# Patient Record
Sex: FEMALE | Race: WHITE | NOT HISPANIC OR LATINO | Employment: FULL TIME | URBAN - NONMETROPOLITAN AREA
[De-identification: names, ages, dates, MRNs, and addresses within clinical notes are randomized per-mention and may not be internally consistent; named-entity substitution may affect disease eponyms.]

---

## 2018-02-16 ENCOUNTER — HISTORICAL (OUTPATIENT)
Dept: ADMINISTRATIVE | Facility: HOSPITAL | Age: 43
End: 2018-02-16

## 2018-03-07 ENCOUNTER — HISTORICAL (OUTPATIENT)
Dept: ADMINISTRATIVE | Facility: HOSPITAL | Age: 43
End: 2018-03-07

## 2018-05-01 ENCOUNTER — HISTORICAL (OUTPATIENT)
Dept: ADMINISTRATIVE | Facility: HOSPITAL | Age: 43
End: 2018-05-01

## 2019-06-10 ENCOUNTER — HISTORICAL (OUTPATIENT)
Dept: ADMINISTRATIVE | Facility: HOSPITAL | Age: 44
End: 2019-06-10

## 2021-06-08 LAB
ALBUMIN SERPL-MCNC: 4.2 G/DL (ref 3.4–5)
ALBUMIN/GLOB SERPL: 1.6 {RATIO}
ALP SERPL-CCNC: 90 U/L (ref 50–144)
ALT SERPL-CCNC: 23 U/L (ref 1–45)
ANION GAP SERPL CALC-SCNC: 6 MMOL/L (ref 7–16)
AST SERPL-CCNC: 34 U/L (ref 14–36)
BILIRUB SERPL-MCNC: 0.35 MG/DL (ref 0.1–1)
BUN SERPL-MCNC: 15 MG/DL (ref 7–20)
CALCIUM SERPL-MCNC: 9.9 MG/DL (ref 8.4–10.2)
CHLORIDE SERPL-SCNC: 103 MMOL/L (ref 94–110)
CHOLEST SERPL-MCNC: 213 MG/DL (ref 0–200)
CO2 SERPL-SCNC: 30 MMOL/L (ref 21–32)
CREAT SERPL-MCNC: 0.7 MG/DL (ref 0.52–1.04)
CREAT/UREA NIT SERPL: 21.4 (ref 12–20)
GLOBULIN SER-MCNC: 2.7 G/DL (ref 2–3.9)
GLUCOSE SERPL-MCNC: 107 MGM./DL (ref 70–115)
HDLC SERPL-MCNC: 64 MG/DL (ref 40–60)
LDLC SERPL CALC-MCNC: 112.1 MG/DL (ref 30–100)
POTASSIUM SERPL-SCNC: 4 MMOL/L (ref 3.5–5.1)
PROT SERPL-MCNC: 6.9 G/DL (ref 6.3–8.2)
SODIUM SERPL-SCNC: 139 MMOL/L (ref 135–145)
TRIGL SERPL-MCNC: 153 MG/DL (ref 30–200)

## 2021-07-12 LAB — TSH SERPL-ACNC: 21.8 UIU/ML (ref 0.36–3.74)

## 2021-10-15 LAB
ALBUMIN SERPL-MCNC: 4.1 G/DL (ref 3.4–5)
ALBUMIN/GLOB SERPL: 1.6 {RATIO}
ALP SERPL-CCNC: 83 U/L (ref 50–144)
ALT SERPL-CCNC: 22 U/L (ref 1–45)
ANION GAP SERPL CALC-SCNC: 8 MMOL/L (ref 7–16)
AST SERPL-CCNC: 32 U/L (ref 14–36)
BILIRUB SERPL-MCNC: 0.29 MG/DL (ref 0.1–1)
BUN SERPL-MCNC: 15 MG/DL (ref 7–20)
CALCIUM SERPL-MCNC: 9.7 MG/DL (ref 8.4–10.2)
CHLORIDE SERPL-SCNC: 104 MMOL/L (ref 94–110)
CO2 SERPL-SCNC: 30 MMOL/L (ref 21–32)
CREAT SERPL-MCNC: 0.69 MG/DL (ref 0.52–1.04)
CREAT/UREA NIT SERPL: 21.7 (ref 12–20)
GLOBULIN SER-MCNC: 2.6 G/DL (ref 2–3.9)
GLUCOSE SERPL-MCNC: 93 MGM./DL (ref 70–115)
POTASSIUM SERPL-SCNC: 4 MMOL/L (ref 3.5–5.1)
PROT SERPL-MCNC: 6.7 G/DL (ref 6.3–8.2)
SODIUM SERPL-SCNC: 142 MMOL/L (ref 135–145)
TSH SERPL-ACNC: <0.07 UIU/ML (ref 0.36–3.74)

## 2021-12-14 LAB
ALBUMIN SERPL-MCNC: 4.7 G/DL (ref 3.4–5)
ALBUMIN/GLOB SERPL: 1.7 {RATIO}
ALP SERPL-CCNC: 87 U/L (ref 50–144)
ALT SERPL-CCNC: 18 U/L (ref 1–45)
ANION GAP SERPL CALC-SCNC: 6 MMOL/L (ref 2–13)
AST SERPL-CCNC: 29 U/L (ref 14–36)
BILIRUB SERPL-MCNC: 0.33 MG/DL (ref 0–1)
BUN SERPL-MCNC: 16 MG/DL (ref 7–20)
CALCIUM SERPL-MCNC: 9.7 MG/DL (ref 8.4–10.2)
CHLORIDE SERPL-SCNC: 105 MMOL/L (ref 94–110)
CO2 SERPL-SCNC: 29 MMOL/L (ref 21–32)
CREAT SERPL-MCNC: 0.8 MG/DL (ref 0.52–1.04)
CREAT/UREA NIT SERPL: 20 (ref 12–20)
GLOBULIN SER-MCNC: 2.7 G/DL (ref 2–3.9)
GLUCOSE SERPL-MCNC: 110 MGM./DL (ref 70–115)
POTASSIUM SERPL-SCNC: 3.6 MMOL/L (ref 3.5–5.1)
PROT SERPL-MCNC: 7.4 G/DL (ref 6.3–8.2)
SODIUM SERPL-SCNC: 140 MMOL/L (ref 135–145)
T4 FREE SERPL-MCNC: 0.45 NG/DL (ref 0.78–2.19)
TSH SERPL-ACNC: 47.7 UIU/ML (ref 0.36–3.74)

## 2022-02-04 LAB — TSH SERPL-ACNC: 0.21 M[IU]/L (ref 0.36–3.74)

## 2022-02-15 LAB
AGE: 48
ALBUMIN SERPL-MCNC: 4 G/DL (ref 3.4–5)
ALBUMIN/GLOB SERPL: 1.5 {RATIO}
ALP SERPL-CCNC: 84 U/L (ref 50–144)
ALT SERPL-CCNC: 14 U/L (ref 1–45)
ANION GAP SERPL CALC-SCNC: 6 MMOL/L (ref 2–13)
AST SERPL-CCNC: 24 U/L (ref 14–36)
BILIRUB SERPL-MCNC: 0.35 MG/DL (ref 0–1)
BUN SERPL-MCNC: 19 MG/DL (ref 7–20)
CALCIUM SERPL-MCNC: 9.2 MG/DL (ref 8.4–10.2)
CHLORIDE SERPL-SCNC: 106 MMOL/L (ref 98–110)
CO2 SERPL-SCNC: 29 MMOL/L (ref 21–32)
CREAT SERPL-MCNC: 0.66 MG/DL (ref 0.52–1.04)
CREAT/UREA NIT SERPL: 28.8 (ref 12–20)
GLOBULIN SER-MCNC: 2.6 G/DL (ref 2–3.9)
GLUCOSE SERPL-MCNC: 114 MG/DL (ref 70–115)
POTASSIUM SERPL-SCNC: 4.1 MMOL/L (ref 3.5–5.1)
PROT SERPL-MCNC: 6.6 G/DL (ref 6.3–8.2)
SODIUM SERPL-SCNC: 141 MMOL/L (ref 135–145)
TSH SERPL-ACNC: <0.07 M[IU]/L (ref 0.36–3.74)

## 2022-04-10 ENCOUNTER — HISTORICAL (OUTPATIENT)
Dept: ADMINISTRATIVE | Facility: HOSPITAL | Age: 47
End: 2022-04-10

## 2022-04-26 VITALS
HEIGHT: 55 IN | WEIGHT: 173.06 LBS | SYSTOLIC BLOOD PRESSURE: 118 MMHG | BODY MASS INDEX: 40.05 KG/M2 | OXYGEN SATURATION: 99 % | DIASTOLIC BLOOD PRESSURE: 88 MMHG

## 2022-05-04 ENCOUNTER — HISTORICAL (OUTPATIENT)
Dept: ADMINISTRATIVE | Facility: HOSPITAL | Age: 47
End: 2022-05-04

## 2023-08-02 NOTE — PROGRESS NOTES
Chief Complaint: Annual exam    Chief Complaint   Patient presents with    Well Woman       HPI:   Amparo Dhaliwal is a 48 y.o. year old  Presents today to reestablish Gyn care. Denies vaginal bleeding.       GYN History:  MENOPAUSAL:  Age at menarche: 10  Age at menopause: 38  Hysterectomy:  No  Last pap: 2018 WNL, +HPV, Negative 16/18/45/GC/CZ/TV  H/o Abnormal Pap: Yes   Colposcopy: Yes  Postcoital Bleeding: No  Sexually Active: Yes   Dyspareunia: No  H/o STI: HPV  HRT: None  MM18  H/o abnl MMG: no   Colonoscopy: 2019 WNL, repeat in 10 yrs         Past Medical History:   Diagnosis Date    Abnormal Pap smear of cervix     Depression     Hypothyroidism, unspecified     Postmenopausal bleeding      Past Surgical History:   Procedure Laterality Date    APPENDECTOMY      cervix frozen d/t abnormal pap      DILATION AND CURETTAGE OF UTERUS  2018    Dr. Ye Forrest; PMB, thickened endometrial stripe    DILATION AND CURETTAGE OF UTERUS USING SUCTION      [facility]    TUBAL LIGATION  09/10/1999       Current Outpatient Medications:     naproxen (NAPROSYN) 500 MG tablet, Take 500 mg by mouth 2 (two) times daily., Disp: , Rfl:     tiZANidine (ZANAFLEX) 4 MG tablet, Take 4 mg by mouth., Disp: , Rfl:   Review of patient's allergies indicates:  No Known Allergies  OB History    Para Term  AB Living   5 4 3 1 1 4   SAB IAB Ectopic Multiple Live Births   1       4      # Outcome Date GA Lbr Andrea/2nd Weight Sex Delivery Anes PTL Lv   5 Term 09/10/99 40w0d  3.175 kg (7 lb) F CS-LTranv Spinal  WILLIAM   4  98 34w0d  3.175 kg (7 lb) M CS-LTranv Spinal  WILLIAM      Complications: Pre-eclampsia, severe, Low amniotic fluid   3 Term 95 40w0d  3.175 kg (7 lb) F CS-LTranv Spinal  WILLIAM   2 Term 93 40w0d  2.722 kg (6 lb) M Vag-Spont   WILLIAM   1 SAB  6w0d       FD     Social History     Tobacco Use    Smoking status: Every Day     Current packs/day: 0.00     Types: Cigarettes, Vaping  "with nicotine    Smokeless tobacco: Never   Substance Use Topics    Alcohol use: Never    Drug use: Yes     Frequency: 7.0 times per week     Types: Marijuana     Comment: "once daily, I have the medical card"     Family History   Problem Relation Age of Onset    Colon cancer Father     Thyroid cancer Father     Ovarian cancer Mother     Breast cancer Mother        Review of Systems:   Review of Systems   Constitutional:  Negative for appetite change, chills, fatigue, fever and unexpected weight change.   Eyes:  Negative for visual disturbance.   Respiratory:  Negative for cough, shortness of breath and wheezing.    Cardiovascular:  Negative for chest pain, palpitations and leg swelling.   Gastrointestinal:  Negative for abdominal pain, bloating, blood in stool, constipation, diarrhea, nausea, vomiting, reflux and fecal incontinence.   Endocrine: Negative for hair loss and hot flashes.   Genitourinary:  Negative for bladder incontinence, decreased libido, dysmenorrhea, dyspareunia, dysuria, flank pain, frequency, genital sores, hematuria, hot flashes, menorrhagia, menstrual problem, pelvic pain, urgency, vaginal bleeding, vaginal discharge, vaginal pain, urinary incontinence, postcoital bleeding, postmenopausal bleeding, vaginal dryness and vaginal odor.   Integumentary:  Negative for rash, acne, hair changes, breast mass, nipple discharge, breast skin changes and breast tenderness.   Neurological:  Negative for headaches.   Psychiatric/Behavioral:  Negative for depression.    Breast: Negative for asymmetry, breast self exam, lump, mass, mastodynia, nipple discharge, skin changes and tenderness       Physical Exam:  /70 (BP Location: Left arm, Patient Position: Sitting)   Ht 4' 11" (1.499 m)   Wt 74.3 kg (163 lb 12.8 oz)   BMI 33.08 kg/m²       Physical Exam:   Constitutional: She is oriented to person, place, and time. She appears well-developed and well-nourished.    HENT:   Head: Normocephalic.    "   Cardiovascular:       Exam reveals no edema.        Pulmonary/Chest: Effort normal. She exhibits no mass, no tenderness, no bony tenderness, no deformity and no retraction. Right breast exhibits no inverted nipple, no mass, no nipple discharge, no skin change, no tenderness, no bleeding, no swelling, no mastectomy, no augmentation and no lumpectomy. Left breast exhibits no inverted nipple, no mass, no nipple discharge, no skin change, no tenderness, no bleeding, no swelling, no mastectomy, no augmentation and no lumpectomy. Breasts are symmetrical.        Abdominal: Soft. She exhibits no distension and no mass. There is no abdominal tenderness. There is no rebound and no guarding. No hernia. Hernia confirmed negative in the right inguinal area.     Genitourinary:    Inguinal canal, vagina, uterus, right adnexa, left adnexa and rectum normal.   Rectum:      No anal fissure or external hemorrhoid.   The external female genitalia was normal.   No external genitalia lesions identified,Genitalia hair distrobution normal .   Labial bartholins normal.There is no rash, tenderness, lesion or injury on the right labia. There is no rash, tenderness, lesion or injury on the left labia. Cervix is normal. No no masses or organomegaly. Right adnexum displays no mass, no tenderness and no fullness. Left adnexum displays no mass, no tenderness and no fullness. Vagina exhibits no lesion. No erythema,  no vaginal discharge, tenderness, bleeding, rectocele, cystocele or unspecified prolapse of vaginal walls in the vagina.    No foreign body in the vagina.      No signs of injury in the vagina.   Vagina was moist.Cervix exhibits no motion tenderness, no lesion, no discharge, no friability, no lesion, no tenderness and no polyp. Uterus consistancy normal. and Uerus contour normal  Uterus is not deviated, not enlarged, not fixed, not tender, not hosting fibroids and no uterine prolapse. Normal urethral meatus.Urethral Meatus exhibits:  urethral lesion and prolapsedUrethra findings: no urethral mass and no tendernessBladder findings: no bladder tenderness          Musculoskeletal: Normal range of motion.      Lymphadenopathy: No inguinal adenopathy noted on the right or left side.    Neurological: She is alert and oriented to person, place, and time.    Skin: Skin is warm and dry.    Psychiatric: She has a normal mood and affect. Her behavior is normal. Judgment and thought content normal.        Assessment:   Annual Well Women Exam  1. Encounter for well woman exam with abnormal findings  - Liquid-Based Pap Smear, Screening Screening    2. Class 1 obesity with body mass index (BMI) of 33.0 to 33.9 in adult, unspecified obesity type, unspecified whether serious comorbidity present    3. Encounter for screening mammogram for breast cancer  - Mammo Digital Screening Bilat w/ [facility]ust[facility]; Future    4. Screening for malignant neoplasm of the cervix  - Liquid-Based Pap Smear, Screening Screening        Plan:  Pap Done  MMG  Breast Self-awareness  Recommend annual mammogram  Recommend exercise at least 3 times weekly  Healthy, balanced diet  Keep yearly follow up with PCP  RTC annual/prn

## 2023-08-04 ENCOUNTER — OFFICE VISIT (OUTPATIENT)
Dept: OBSTETRICS AND GYNECOLOGY | Facility: CLINIC | Age: 48
End: 2023-08-04
Payer: MEDICAID

## 2023-08-04 VITALS
BODY MASS INDEX: 33.02 KG/M2 | DIASTOLIC BLOOD PRESSURE: 70 MMHG | SYSTOLIC BLOOD PRESSURE: 116 MMHG | WEIGHT: 163.81 LBS | HEIGHT: 59 IN

## 2023-08-04 DIAGNOSIS — Z12.4 SCREENING FOR MALIGNANT NEOPLASM OF THE CERVIX: ICD-10-CM

## 2023-08-04 DIAGNOSIS — E66.9 CLASS 1 OBESITY WITH BODY MASS INDEX (BMI) OF 33.0 TO 33.9 IN ADULT, UNSPECIFIED OBESITY TYPE, UNSPECIFIED WHETHER SERIOUS COMORBIDITY PRESENT: ICD-10-CM

## 2023-08-04 DIAGNOSIS — Z12.31 ENCOUNTER FOR SCREENING MAMMOGRAM FOR BREAST CANCER: ICD-10-CM

## 2023-08-04 DIAGNOSIS — Z01.411 ENCOUNTER FOR WELL WOMAN EXAM WITH ABNORMAL FINDINGS: Primary | ICD-10-CM

## 2023-08-04 PROCEDURE — 3074F SYST BP LT 130 MM HG: CPT | Mod: CPTII,,, | Performed by: OBSTETRICS & GYNECOLOGY

## 2023-08-04 PROCEDURE — 88174 CYTOPATH C/V AUTO IN FLUID: CPT | Performed by: OBSTETRICS & GYNECOLOGY

## 2023-08-04 PROCEDURE — 3078F DIAST BP <80 MM HG: CPT | Mod: CPTII,,, | Performed by: OBSTETRICS & GYNECOLOGY

## 2023-08-04 PROCEDURE — 3008F BODY MASS INDEX DOCD: CPT | Mod: CPTII,,, | Performed by: OBSTETRICS & GYNECOLOGY

## 2023-08-04 PROCEDURE — 3078F PR MOST RECENT DIASTOLIC BLOOD PRESSURE < 80 MM HG: ICD-10-PCS | Mod: CPTII,,, | Performed by: OBSTETRICS & GYNECOLOGY

## 2023-08-04 PROCEDURE — 99386 PR PREVENTIVE VISIT,NEW,40-64: ICD-10-PCS | Mod: ,,, | Performed by: OBSTETRICS & GYNECOLOGY

## 2023-08-04 PROCEDURE — 3074F PR MOST RECENT SYSTOLIC BLOOD PRESSURE < 130 MM HG: ICD-10-PCS | Mod: CPTII,,, | Performed by: OBSTETRICS & GYNECOLOGY

## 2023-08-04 PROCEDURE — 3008F PR BODY MASS INDEX (BMI) DOCUMENTED: ICD-10-PCS | Mod: CPTII,,, | Performed by: OBSTETRICS & GYNECOLOGY

## 2023-08-04 PROCEDURE — 99386 PREV VISIT NEW AGE 40-64: CPT | Mod: ,,, | Performed by: OBSTETRICS & GYNECOLOGY

## 2023-08-04 PROCEDURE — 1159F PR MEDICATION LIST DOCUMENTED IN MEDICAL RECORD: ICD-10-PCS | Mod: CPTII,,, | Performed by: OBSTETRICS & GYNECOLOGY

## 2023-08-04 PROCEDURE — 1159F MED LIST DOCD IN RCRD: CPT | Mod: CPTII,,, | Performed by: OBSTETRICS & GYNECOLOGY

## 2023-08-04 PROCEDURE — 87624 HPV HI-RISK TYP POOLED RSLT: CPT

## 2023-08-04 RX ORDER — TIZANIDINE 4 MG/1
4 TABLET ORAL
COMMUNITY
Start: 2023-07-27

## 2023-08-04 RX ORDER — NAPROXEN 500 MG/1
500 TABLET ORAL 2 TIMES DAILY
COMMUNITY
Start: 2023-07-31

## 2023-08-09 LAB — PSYCHE PATHOLOGY RESULT: NORMAL

## 2023-08-11 ENCOUNTER — HOSPITAL ENCOUNTER (OUTPATIENT)
Dept: RADIOLOGY | Facility: HOSPITAL | Age: 48
Discharge: HOME | End: 2023-08-11
Attending: OBSTETRICS & GYNECOLOGY
Payer: MEDICAID

## 2023-08-11 VITALS — WEIGHT: 163 LBS | HEIGHT: 59 IN | BODY MASS INDEX: 32.86 KG/M2

## 2023-08-11 DIAGNOSIS — Z12.31 ENCOUNTER FOR SCREENING MAMMOGRAM FOR BREAST CANCER: ICD-10-CM

## 2023-08-11 PROCEDURE — 77067 SCR MAMMO BI INCL CAD: CPT | Mod: TC

## 2023-08-15 ENCOUNTER — PATIENT MESSAGE (OUTPATIENT)
Dept: OBSTETRICS AND GYNECOLOGY | Facility: CLINIC | Age: 48
End: 2023-08-15
Payer: MEDICAID

## 2023-08-15 ENCOUNTER — TELEPHONE (OUTPATIENT)
Dept: OBSTETRICS AND GYNECOLOGY | Facility: CLINIC | Age: 48
End: 2023-08-15
Payer: MEDICAID

## 2023-08-15 DIAGNOSIS — R92.8 MAMMOGRAM ABNORMAL: Primary | ICD-10-CM

## 2023-08-15 NOTE — TELEPHONE ENCOUNTER
----- Message from Brylee Guillory sent at 8/15/2023  3:08 PM CDT -----  Regarding: Ultrasound Order  Contact: Felicity  Patient called stating that she had a mammogram done at the [facility] and received a letter in the mail saying that she needs an ultrasound on her breast. Patient wants to know if you can put in an ultrasound order so she can complete this. Patient call back number [phone].

## 2023-09-01 ENCOUNTER — HOSPITAL ENCOUNTER (OUTPATIENT)
Dept: RADIOLOGY | Facility: HOSPITAL | Age: 48
Discharge: HOME | End: 2023-09-01
Attending: OBSTETRICS & GYNECOLOGY
Payer: MEDICAID

## 2023-09-01 DIAGNOSIS — R92.8 MAMMOGRAM ABNORMAL: ICD-10-CM

## 2023-09-01 PROCEDURE — 76641 ULTRASOUND BREAST COMPLETE: CPT | Mod: TC,50
